# Patient Record
Sex: FEMALE | Race: WHITE | Employment: OTHER | URBAN - METROPOLITAN AREA
[De-identification: names, ages, dates, MRNs, and addresses within clinical notes are randomized per-mention and may not be internally consistent; named-entity substitution may affect disease eponyms.]

---

## 2020-08-20 ENCOUNTER — HOSPITAL ENCOUNTER (OUTPATIENT)
Dept: MAMMOGRAPHY | Age: 65
Discharge: HOME | End: 2020-08-20
Attending: OBSTETRICS & GYNECOLOGY
Payer: MEDICARE

## 2020-08-20 DIAGNOSIS — Z13.820 SCREENING FOR OSTEOPOROSIS: ICD-10-CM

## 2020-08-20 PROCEDURE — 77080 DXA BONE DENSITY AXIAL: CPT

## 2023-05-22 RX ORDER — DULOXETIN HYDROCHLORIDE 30 MG/1
30 CAPSULE, DELAYED RELEASE ORAL DAILY
COMMUNITY

## 2023-05-22 RX ORDER — OMEPRAZOLE 20 MG/1
20 CAPSULE, DELAYED RELEASE ORAL DAILY
COMMUNITY

## 2023-05-22 RX ORDER — OXYCODONE HYDROCHLORIDE 5 MG/1
5 TABLET ORAL EVERY 8 HOURS PRN
COMMUNITY
Start: 2013-08-04

## 2023-05-22 RX ORDER — PROMETHAZINE HYDROCHLORIDE 25 MG/1
25 TABLET ORAL EVERY 6 HOURS PRN
COMMUNITY
Start: 2013-08-04

## 2023-05-22 RX ORDER — SULINDAC 200 MG/1
200 TABLET ORAL 2 TIMES DAILY
COMMUNITY

## 2023-05-22 RX ORDER — DILTIAZEM HYDROCHLORIDE 360 MG/1
360 CAPSULE, EXTENDED RELEASE ORAL DAILY
COMMUNITY

## 2023-05-22 RX ORDER — METOPROLOL SUCCINATE 50 MG/1
50 TABLET, EXTENDED RELEASE ORAL DAILY
COMMUNITY

## 2023-05-22 RX ORDER — METAXALONE 800 MG/1
800 TABLET ORAL EVERY 4 HOURS PRN
COMMUNITY

## 2023-05-22 RX ORDER — ENOXAPARIN SODIUM 100 MG/ML
40 INJECTION SUBCUTANEOUS DAILY
COMMUNITY
Start: 2013-08-04

## 2023-05-22 RX ORDER — SPIRONOLACTONE 100 MG/1
100 TABLET, FILM COATED ORAL 2 TIMES DAILY
COMMUNITY

## 2023-05-22 RX ORDER — FUROSEMIDE 40 MG/1
40 TABLET ORAL 2 TIMES DAILY
COMMUNITY

## 2023-05-22 RX ORDER — OXYCODONE AND ACETAMINOPHEN 7.5; 325 MG/1; MG/1
1-2 TABLET ORAL EVERY 4 HOURS PRN
COMMUNITY
Start: 2013-08-04

## 2023-05-22 RX ORDER — TRAMADOL HYDROCHLORIDE 50 MG/1
50 TABLET ORAL EVERY 4 HOURS PRN
COMMUNITY
Start: 2013-08-04

## 2024-01-11 ENCOUNTER — HOSPITAL ENCOUNTER (OUTPATIENT)
Facility: HOSPITAL | Age: 69
Discharge: HOME | End: 2024-01-11
Attending: RADIOLOGY | Admitting: RADIOLOGY
Payer: MEDICARE

## 2024-01-11 VITALS — SYSTOLIC BLOOD PRESSURE: 170 MMHG | TEMPERATURE: 97.9 F | DIASTOLIC BLOOD PRESSURE: 73 MMHG | HEART RATE: 61 BPM

## 2024-01-11 DIAGNOSIS — L98.411 NON-PRESSURE CHRONIC ULCER OF BUTTOCK, LIMITED TO BREAKDOWN OF SKIN (HCC): Primary | ICD-10-CM

## 2024-01-11 PROCEDURE — 99203 OFFICE O/P NEW LOW 30 MIN: CPT

## 2024-01-11 RX ORDER — HYDROXYZINE HYDROCHLORIDE 25 MG/1
25 TABLET, FILM COATED ORAL 3 TIMES DAILY PRN
COMMUNITY

## 2024-01-11 RX ORDER — LISINOPRIL 40 MG/1
40 TABLET ORAL DAILY
COMMUNITY

## 2024-01-11 RX ORDER — VIT C/B6/B5/MAGNESIUM/HERB 173 50-5-6-5MG
500 CAPSULE ORAL DAILY
COMMUNITY

## 2024-01-11 RX ORDER — BUMETANIDE 1 MG/1
1 TABLET ORAL DAILY
COMMUNITY

## 2024-01-11 RX ORDER — ASPIRIN 325 MG
325 TABLET ORAL DAILY
COMMUNITY

## 2024-01-11 RX ORDER — MULTIVIT-MIN/FERROUS GLUCONATE 9 MG/15 ML
15 LIQUID (ML) ORAL DAILY
COMMUNITY

## 2024-01-11 RX ORDER — CLONAZEPAM 0.5 MG/1
0.5 TABLET ORAL 2 TIMES DAILY PRN
COMMUNITY

## 2024-01-11 RX ORDER — ALLOPURINOL 100 MG/1
100 TABLET ORAL DAILY
COMMUNITY

## 2024-01-11 RX ORDER — CRANBERRY FRUIT EXTRACT 425 MG
4200 CAPSULE ORAL 2 TIMES DAILY
COMMUNITY

## 2024-01-11 RX ORDER — PHENAZOPYRIDINE HYDROCHLORIDE 200 MG/1
200 TABLET, FILM COATED ORAL 3 TIMES DAILY PRN
COMMUNITY

## 2024-01-11 RX ORDER — ACETAMINOPHEN 500 MG
500 TABLET ORAL EVERY 6 HOURS PRN
COMMUNITY

## 2024-01-11 RX ORDER — CITALOPRAM 40 MG/1
40 TABLET ORAL DAILY
COMMUNITY

## 2024-01-11 RX ORDER — GABAPENTIN 300 MG/1
300 CAPSULE ORAL 3 TIMES DAILY
COMMUNITY

## 2024-01-11 RX ORDER — CALCIUM CARBONATE 500(1250)
500 TABLET ORAL DAILY
COMMUNITY

## 2024-01-11 RX ORDER — CARVEDILOL 25 MG/1
25 TABLET ORAL 2 TIMES DAILY WITH MEALS
COMMUNITY

## 2024-01-11 NOTE — FLOWSHEET NOTE
01/11/24 1010   Wound 01/11/24 Coccyx Left   Date First Assessed/Time First Assessed: 01/11/24 0933   Present on Original Admission: Yes  Wound Approximate Age at First Assessment (Weeks): 4 weeks  Location: Coccyx  Wound Location Orientation: Left   Wound Cleansed Soap and water   Dressing/Treatment Zinc paste   Offloading for Diabetic Foot Ulcers Offloading not required     Discharge Condition: Stable    Pain: 0    Ambulatory Status: Straight Cane    Discharge Destination: home    Transportation:car    Accompanied by: SELF    Discharge instructions reviewed with SELF and copy or written instructions have been provided. All questions/concerns have been addressed at this time.

## 2024-01-11 NOTE — FLOWSHEET NOTE
01/11/24 0933   Wound 01/11/24 Coccyx Left   Date First Assessed/Time First Assessed: 01/11/24 0933   Present on Original Admission: Yes  Wound Approximate Age at First Assessment (Weeks): 4 weeks  Location: Coccyx  Wound Location Orientation: Left   Wound Image    Dressing Status   (manuel)   Wound Cleansed Not Cleansed   Offloading for Diabetic Foot Ulcers Offloading not ordered   Wound Length (cm) 1.5 cm   Wound Width (cm) 0.6 cm   Wound Depth (cm) 0.2 cm   Wound Surface Area (cm^2) 0.9 cm^2   Wound Volume (cm^3) 0.18 cm^3   Wound Assessment Pink/red   Drainage Amount Small (< 25%)   Drainage Description Serous   Odor None   Allie-wound Assessment Blanchable erythema   Margins Defined edges   Wound Thickness Description not for Pressure Injury Partial thickness   Pain Assessment   Pain Assessment 0-10   Pain Level 7   Patient's Stated Pain Goal 0 - No pain   Pain Location Knee     BP (!) 170/73 Comment: hasn't taken meds this morning  Pulse 61   Temp 97.9 °F (36.6 °C) (Temporal)

## 2024-01-11 NOTE — PATIENT INSTRUCTIONS
Discharge Instructions/Wound Care Orders  [facility]  [address]  [place] [address]  Phone: [phone] Fax: [phone]    NAME:  María Art  YOB: 1955  MEDICAL RECORD NUMBER:  [id number]  DATE:  January 11, 2024    WOUND CARE ORDERS:  Gluteal fold wound - Apply zinc oxide or diaper rash cream to wound and ambrocio wound daily.     After bowel movement, cleanse with soap and water. Dry area with a fan or hair dryer on cool setting.     Activity:  [x] Turn/reposition every 2 hours when in bed.  [x] Avoid direct pressure on wound site.   [x] When sitting, shift position or do seat lifts every 15 minutes.   [x] Limit side lying to 30 degree tilt.   [x] Limit head of bed elevation to 30 degrees.   [x] Use speciality pressure relief cushion, mattress as appropriate.       Dietary:  [] Diet as tolerated      [x] Diabetic Diet            [] Increase Protein: examples (Meat, cheese, eggs, greek yogurt, fish, nuts)          [] Acosta Therapeutic Nutrition Powder    Return Appointment:  [x] Return Appointment: With Dr. Yeni Gasca in 2 weeks.   [] Ordered tests:      Electronically signed MIKAELA BRITT RN on 1/11/2024 at 9:50 AM     Wound Care Center Information: Should you experience any significant changes in your wound(s) or have questions about your wound care, please contact the [facility] at MONDAY - FRIDAY 8:00 am - 4:30.  If you need help with your wound outside these hours and cannot wait until we are again available, contact your PCP or go to the hospital emergency room.     PLEASE NOTE: IF YOU ARE UNABLE TO OBTAIN WOUND SUPPLIES, CONTINUE TO USE THE SUPPLIES YOU HAVE AVAILABLE UNTIL YOU ARE ABLE TO REACH US. IT IS MOST IMPORTANT TO KEEP THE WOUND COVERED AT ALL TIMES.     Physician Signature:_______________________    Date: ___________ Time:  ____________

## 2024-01-12 PROBLEM — L98.411 NON-PRESSURE CHRONIC ULCER OF BUTTOCK, LIMITED TO BREAKDOWN OF SKIN (HCC): Status: ACTIVE | Noted: 2024-01-12

## 2024-01-12 RX ORDER — SODIUM CHLOR/HYPOCHLOROUS ACID 0.033 %
SOLUTION, IRRIGATION IRRIGATION ONCE
OUTPATIENT
Start: 2024-01-12 | End: 2024-01-12

## 2024-01-12 RX ORDER — IBUPROFEN 200 MG
TABLET ORAL ONCE
OUTPATIENT
Start: 2024-01-12 | End: 2024-01-12

## 2024-01-12 RX ORDER — GENTAMICIN SULFATE 1 MG/G
OINTMENT TOPICAL ONCE
OUTPATIENT
Start: 2024-01-12 | End: 2024-01-12

## 2024-01-12 RX ORDER — CLOBETASOL PROPIONATE 0.5 MG/G
OINTMENT TOPICAL ONCE
OUTPATIENT
Start: 2024-01-12 | End: 2024-01-12

## 2024-01-12 RX ORDER — BETAMETHASONE DIPROPIONATE 0.5 MG/G
CREAM TOPICAL ONCE
OUTPATIENT
Start: 2024-01-12 | End: 2024-01-12

## 2024-01-12 RX ORDER — TRIAMCINOLONE ACETONIDE 1 MG/G
OINTMENT TOPICAL ONCE
OUTPATIENT
Start: 2024-01-12 | End: 2024-01-12

## 2024-01-12 RX ORDER — GINSENG 100 MG
CAPSULE ORAL ONCE
OUTPATIENT
Start: 2024-01-12 | End: 2024-01-12

## 2024-01-12 RX ORDER — BACITRACIN ZINC AND POLYMYXIN B SULFATE 500; 1000 [USP'U]/G; [USP'U]/G
OINTMENT TOPICAL ONCE
OUTPATIENT
Start: 2024-01-12 | End: 2024-01-12

## 2024-01-12 NOTE — CONSULTS
[facility]   History and Physical Note   Referring Provider: Kacy Gregorio MD  Reason for Referral: Non healing buttock wound    María Art  MEDICAL RECORD NUMBER:  [id number]  AGE: 68 y.o.   GENDER: female  : 1955  EPISODE DATE:  2024    Chief complaint and reason for visit:     Chief Complaint   Patient presents non healing buttock wound    Wound Check         HISTORY of PRESENT ILLNESS HPI     María Art is a 68 y.o. female who presents today for an initial evaluation of a wound/ulcer. Patient is new to the wound center. Wound duration:  about a  month(s).    History of Wound Context: Ms María Art is a 67 yo female with a history of HTN, Obesity, CHF, DM-2, monoclonal gammopathy, B12 deficiency with megaloblastic anemia, who presents to the [facility] at the request of her new PCP, Dr Gregorio for an evaluation of a wound in the buttock.      Ms Art does not recall exactly when or how the buttock wound occurred but thinks it was just before [place] when she felt something \"sore\" along the left buttock cheek.  She went to her PCP who prescribed Silverdene and referred her to the [facility].  Patient wears a pad due to her urinary incontinence.  Drains slightly.  Is tender or with pain , 7/10.   Denies any bleeding. She does not smoke.  Her last A1c was 5.2 not all that long ago.  She denies any smoking.      Has lower leg edema.  At one time about 3 years ago, she was a patient of the lymphedema clinic.  She had been wearing a compression stocking but notes they have stretched out.  She is currently taking Ozempic as prescribed by her PCP to lose weight and ultimately and hopefully have bilateral knee replacement.     Her adult son lives at home with her.  She comes alone today.     Pertinent associated symptoms: pain severity: intermittent, moderate, pain quality: burning, tender, tingling, and drainage     PAST MEDICAL HISTORY        Diagnosis

## 2024-01-25 ENCOUNTER — HOSPITAL ENCOUNTER (OUTPATIENT)
Facility: HOSPITAL | Age: 69
Discharge: HOME | End: 2024-01-25
Attending: RADIOLOGY | Admitting: RADIOLOGY
Payer: MEDICARE

## 2024-01-25 VITALS
SYSTOLIC BLOOD PRESSURE: 145 MMHG | HEART RATE: 62 BPM | RESPIRATION RATE: 16 BRPM | DIASTOLIC BLOOD PRESSURE: 78 MMHG | TEMPERATURE: 97.3 F

## 2024-01-25 DIAGNOSIS — L98.411 NON-PRESSURE CHRONIC ULCER OF BUTTOCK, LIMITED TO BREAKDOWN OF SKIN (HCC): Primary | ICD-10-CM

## 2024-01-25 PROCEDURE — 99212 OFFICE O/P EST SF 10 MIN: CPT

## 2024-01-25 RX ORDER — SODIUM CHLOR/HYPOCHLOROUS ACID 0.033 %
SOLUTION, IRRIGATION IRRIGATION ONCE
Status: CANCELLED | OUTPATIENT
Start: 2024-01-25 | End: 2024-01-25

## 2024-01-25 RX ORDER — TRIAMCINOLONE ACETONIDE 1 MG/G
OINTMENT TOPICAL ONCE
Status: CANCELLED | OUTPATIENT
Start: 2024-01-25 | End: 2024-01-25

## 2024-01-25 RX ORDER — BACITRACIN ZINC AND POLYMYXIN B SULFATE 500; 1000 [USP'U]/G; [USP'U]/G
OINTMENT TOPICAL ONCE
Status: CANCELLED | OUTPATIENT
Start: 2024-01-25 | End: 2024-01-25

## 2024-01-25 RX ORDER — CLOBETASOL PROPIONATE 0.5 MG/G
OINTMENT TOPICAL ONCE
Status: CANCELLED | OUTPATIENT
Start: 2024-01-25 | End: 2024-01-25

## 2024-01-25 RX ORDER — IBUPROFEN 200 MG
TABLET ORAL ONCE
Status: CANCELLED | OUTPATIENT
Start: 2024-01-25 | End: 2024-01-25

## 2024-01-25 RX ORDER — BETAMETHASONE DIPROPIONATE 0.5 MG/G
CREAM TOPICAL ONCE
Status: CANCELLED | OUTPATIENT
Start: 2024-01-25 | End: 2024-01-25

## 2024-01-25 RX ORDER — GENTAMICIN SULFATE 1 MG/G
OINTMENT TOPICAL ONCE
Status: CANCELLED | OUTPATIENT
Start: 2024-01-25 | End: 2024-01-25

## 2024-01-25 RX ORDER — GINSENG 100 MG
CAPSULE ORAL ONCE
Status: CANCELLED | OUTPATIENT
Start: 2024-01-25 | End: 2024-01-25

## 2024-01-25 NOTE — FLOWSHEET NOTE
01/25/24 1053   Wound 01/11/24 Coccyx Left   Date First Assessed/Time First Assessed: 01/11/24 0933   Present on Original Admission: Yes  Wound Approximate Age at First Assessment (Weeks): 4 weeks  Location: Coccyx  Wound Location Orientation: Left   Wound Image    Dressing Status Intact   Wound Cleansed Cleansed with saline   Wound Length (cm) 0.1 cm   Wound Width (cm) 0.1 cm   Wound Depth (cm) 0.1 cm   Wound Surface Area (cm^2) 0.01 cm^2   Change in Wound Size % (l*w) 98.89   Wound Volume (cm^3) 0.001 cm^3   Wound Healing % 99   Wound Assessment Epithelialization   Drainage Amount None (dry)   Odor None   Allie-wound Assessment Intact   Margins Attached edges   Wound Thickness Description not for Pressure Injury Partial thickness   Pain Assessment   Pain Assessment None - Denies Pain     BP (!) 145/78   Pulse 62   Temp 97.3 °F (36.3 °C) (Temporal)   Resp 16

## 2024-01-25 NOTE — PATIENT INSTRUCTIONS
Discharge Instructions/Wound Care Orders  [facility]  [address]  [place] [address]  Phone: [phone] Fax: [phone]    NAME:  María Art  YOB: 1955  MEDICAL RECORD NUMBER:  [id number]  DATE:  January 25, 2024    WOUND CARE ORDERS:  Gluteal fold wound - Apply zinc oxide or diaper rash cream backside as needed.     Dry area with a fan or hair dryer on cool setting to make sure completely dry after showering.     Activity:  [x] Turn/reposition every 2 hours when in bed.  [x] Avoid direct pressure on wound site.   [x] When sitting, shift position or do seat lifts every 15 minutes.   [x] Limit side lying to 30 degree tilt.   [x] Limit head of bed elevation to 30 degrees.   [x] Use speciality pressure relief cushion, mattress as appropriate.       Dietary:  [] Diet as tolerated      [x] Diabetic Diet            [] Increase Protein: examples (Meat, cheese, eggs, greek yogurt, fish, nuts)          [] Acosta Therapeutic Nutrition Powder    Return Appointment:  [x] Return Appointment: With Dr. Yeni Gasca as needed.  [] Ordered tests:      Electronically signed MIKAELA BRITT RN on 1/25/2024 at 11:34 AM     Wound Care Center Information: Should you experience any significant changes in your wound(s) or have questions about your wound care, please contact the [facility] at MONDAY - FRIDAY 8:00 am - 4:30.  If you need help with your wound outside these hours and cannot wait until we are again available, contact your PCP or go to the hospital emergency room.     PLEASE NOTE: IF YOU ARE UNABLE TO OBTAIN WOUND SUPPLIES, CONTINUE TO USE THE SUPPLIES YOU HAVE AVAILABLE UNTIL YOU ARE ABLE TO REACH US. IT IS MOST IMPORTANT TO KEEP THE WOUND COVERED AT ALL TIMES.     Physician Signature:_______________________    Date: ___________ Time:  ____________